# Patient Record
Sex: FEMALE | Race: WHITE | NOT HISPANIC OR LATINO | Employment: FULL TIME | URBAN - METROPOLITAN AREA
[De-identification: names, ages, dates, MRNs, and addresses within clinical notes are randomized per-mention and may not be internally consistent; named-entity substitution may affect disease eponyms.]

---

## 2017-03-01 ENCOUNTER — HOSPITAL ENCOUNTER (OUTPATIENT)
Dept: GENERAL RADIOLOGY | Facility: HOSPITAL | Age: 27
Setting detail: SPECIMEN
Discharge: HOME | End: 2017-03-01

## 2017-03-08 LAB
M TB TUBERC IFN-G BLD QL: NORMAL

## 2020-02-19 ENCOUNTER — TELEPHONE (OUTPATIENT)
Dept: URGENT CARE | Facility: CLINIC | Age: 30
End: 2020-02-19

## 2020-02-19 DIAGNOSIS — H66.003 ACUTE SUPPURATIVE OTITIS MEDIA OF BOTH EARS WITHOUT SPONTANEOUS RUPTURE OF TYMPANIC MEMBRANES, RECURRENCE NOT SPECIFIED: Primary | ICD-10-CM

## 2020-02-19 RX ORDER — AMOXICILLIN 875 MG/1
875 TABLET, COATED ORAL 2 TIMES DAILY
Qty: 20 TABLET | Refills: 0 | Status: SHIPPED | OUTPATIENT
Start: 2020-02-19 | End: 2020-02-29

## 2020-02-19 NOTE — TELEPHONE ENCOUNTER
Patient given results,has tolerated amoxicillin in the past,ask if  is send a different medicine.She would like the rx sent to [facility] [address]

## 2020-02-19 NOTE — TELEPHONE ENCOUNTER
Okay to discontinue cefdinir.  Cefdinir is distantly related to amoxicillin, but if patient has tolerated amoxicillin in the past, and this seems reasonable.  If she is having anything other than a mild rash, mild stomach upset, then she needs to be seen to rule out a more serious reaction.  Please document Cefdinir as a new drug allergy, rash.

## 2021-09-08 PROCEDURE — U0004 COV-19 TEST NON-CDC HGH THRU: HCPCS | Performed by: NURSE PRACTITIONER
